# Patient Record
Sex: FEMALE | Race: WHITE | NOT HISPANIC OR LATINO | Employment: FULL TIME | URBAN - METROPOLITAN AREA
[De-identification: names, ages, dates, MRNs, and addresses within clinical notes are randomized per-mention and may not be internally consistent; named-entity substitution may affect disease eponyms.]

---

## 2018-08-13 ENCOUNTER — OFFICE VISIT (OUTPATIENT)
Dept: MEDICAL GROUP | Facility: MEDICAL CENTER | Age: 28
End: 2018-08-13
Payer: COMMERCIAL

## 2018-08-13 VITALS
HEIGHT: 67 IN | RESPIRATION RATE: 20 BRPM | BODY MASS INDEX: 43.32 KG/M2 | OXYGEN SATURATION: 96 % | HEART RATE: 77 BPM | DIASTOLIC BLOOD PRESSURE: 78 MMHG | TEMPERATURE: 98.7 F | SYSTOLIC BLOOD PRESSURE: 118 MMHG | WEIGHT: 276 LBS

## 2018-08-13 DIAGNOSIS — F33.2 SEVERE EPISODE OF RECURRENT MAJOR DEPRESSIVE DISORDER, WITHOUT PSYCHOTIC FEATURES (HCC): ICD-10-CM

## 2018-08-13 DIAGNOSIS — Z30.09 ENCOUNTER FOR COUNSELING REGARDING CONTRACEPTION: ICD-10-CM

## 2018-08-13 DIAGNOSIS — Z00.00 PREVENTATIVE HEALTH CARE: ICD-10-CM

## 2018-08-13 DIAGNOSIS — E66.9 OBESITY (BMI 30-39.9): ICD-10-CM

## 2018-08-13 PROBLEM — F43.23 ADJUSTMENT DISORDER WITH MIXED ANXIETY AND DEPRESSED MOOD: Status: ACTIVE | Noted: 2018-08-13

## 2018-08-13 PROCEDURE — 99402 PREV MED CNSL INDIV APPRX 30: CPT | Performed by: FAMILY MEDICINE

## 2018-08-13 PROCEDURE — 99204 OFFICE O/P NEW MOD 45 MIN: CPT | Mod: 25 | Performed by: FAMILY MEDICINE

## 2018-08-13 RX ORDER — BUPROPION HYDROCHLORIDE 150 MG/1
150 TABLET ORAL EVERY MORNING
Qty: 90 TAB | Refills: 3 | Status: SHIPPED | OUTPATIENT
Start: 2018-08-13

## 2018-08-13 NOTE — PROGRESS NOTES
"This medical record contains text that has been entered with the assistance of computer voice recognition and dictation software.  Therefore, it may contain unintended errors in text, spelling, punctuation, or grammar        Chief Complaint   Patient presents with   • Establish Care   • Depression     Pt. is grieving due to a death in her family 11/2017       Arianna Willams is a 27 y.o. 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     • buPROPion (WELLBUTRIN XL) 150 MG XL tablet Take 1 Tab by mouth every morning. 90 Tab 3     No current facility-administered medications for this visit.      Patient Active Problem List    Diagnosis Date Noted   • Severe episode of recurrent major depressive disorder (HCC) 08/13/2018   • Preventative health care 08/13/2018   • Obesity (BMI 30-39.9) 08/13/2018   • Encounter for counseling regarding contraception 08/13/2018     History reviewed. No pertinent surgical history.   Social History   Substance Use Topics   • Smoking status: Never Smoker   • Smokeless tobacco: Never Used   • Alcohol use Yes      Comment: only socially     History reviewed. No pertinent family history.        ROS    all review of system completed and negative except for those listed above     Objective:     Blood pressure 118/78, pulse 77, temperature 37.1 °C (98.7 °F), resp. rate 20, height 1.689 m (5' 6.5\"), weight (!) 125.2 kg (276 lb), last menstrual period 08/08/2018, SpO2 96 %. Body mass index is 43.88 kg/m².  Physical Exam:        GEN: comfortable, alert and oriented, well nourished, well developed, in no apparent distress   HEENT: NCAT, eyes: pupils equal and reactive, sclera white, EOMIT, good " dentition  HEART: limbs warm and well perfused, regular rate, no JVD, no lower extremity edema  LUNGS: speaking in full sentences, not in apparent respiratory distress, no audible wheezes  MSK: normal tone and bulk, no swelling of the joints, gait steady and normal   She is crying from time to time but she is very pleasant           Assessment and Plan:   The following treatment plan was discussed        Problem List Items Addressed This Visit     Severe episode of recurrent major depressive disorder (HCC)     Start wellbutrin risk benefit side effect discussed  Also recommend counseling and different options discussed [facility] on demand and better health   Follow up 2 mo for depression follow up                Relevant Medications    buPROPion (WELLBUTRIN XL) 150 MG XL tablet    Other Relevant Orders    REFERRAL TO BEHAVIORAL HEALTH    Preventative health care     She is asking for labs  30 min face to face discussing preventative health care specifically wt management and nutrition                   Relevant Orders    TSH WITH REFLEX TO FT4    BASIC METABOLIC PANEL    HEMOGLOBIN A1C    LDL, DIRECT    HDL CHOLESTEROL    CHOLESTEROL    Obesity (BMI 30-39.9)     Counseling provided             Encounter for counseling regarding contraception     Happy with LARC method  All questions answered                           Instructed to follow up if symptoms worsen or fail to improve, ER/UC precautions discussed as well    Meka Brooke MD  [facility], Family Medicine   [address] [place] [address] [address]   [address] [place] [id number]  Phone: [phone]

## 2018-10-15 ENCOUNTER — TELEMEDICINE2 (OUTPATIENT)
Dept: MEDICAL GROUP | Facility: MEDICAL CENTER | Age: 28
End: 2018-10-15
Payer: COMMERCIAL

## 2018-10-15 VITALS — HEIGHT: 67 IN | WEIGHT: 276 LBS | BODY MASS INDEX: 43.32 KG/M2

## 2018-10-15 DIAGNOSIS — J18.9 PNEUMONIA DUE TO INFECTIOUS ORGANISM, UNSPECIFIED LATERALITY, UNSPECIFIED PART OF LUNG: ICD-10-CM

## 2018-10-15 DIAGNOSIS — E66.9 OBESITY (BMI 30-39.9): ICD-10-CM

## 2018-10-15 DIAGNOSIS — F33.2 SEVERE EPISODE OF RECURRENT MAJOR DEPRESSIVE DISORDER, WITHOUT PSYCHOTIC FEATURES (HCC): ICD-10-CM

## 2018-10-15 PROCEDURE — 99401 PREV MED CNSL INDIV APPRX 15: CPT | Performed by: FAMILY MEDICINE

## 2018-10-15 PROCEDURE — 99214 OFFICE O/P EST MOD 30 MIN: CPT | Mod: 25 | Performed by: FAMILY MEDICINE

## 2018-10-15 RX ORDER — ALBUTEROL SULFATE 2.5 MG/3ML
SOLUTION RESPIRATORY (INHALATION)
COMMUNITY
Start: 2018-10-04

## 2018-10-15 RX ORDER — IPRATROPIUM BROMIDE AND ALBUTEROL SULFATE 2.5; .5 MG/3ML; MG/3ML
SOLUTION RESPIRATORY (INHALATION)
COMMUNITY
Start: 2018-10-04

## 2018-10-15 RX ORDER — PREDNISONE 20 MG/1
TABLET ORAL
COMMUNITY
Start: 2018-10-04

## 2018-10-15 RX ORDER — AZITHROMYCIN 250 MG/1
TABLET, FILM COATED ORAL
COMMUNITY
Start: 2018-10-01

## 2018-10-15 ASSESSMENT — PATIENT HEALTH QUESTIONNAIRE - PHQ9
SUM OF ALL RESPONSES TO PHQ9 QUESTIONS 1 AND 2: 2
9. THOUGHTS THAT YOU WOULD BE BETTER OFF DEAD, OR OF HURTING YOURSELF: NOT AT ALL
5. POOR APPETITE OR OVEREATING: NOT AT ALL
2. FEELING DOWN, DEPRESSED, IRRITABLE, OR HOPELESS: SEVERAL DAYS
1. LITTLE INTEREST OR PLEASURE IN DOING THINGS: SEVERAL DAYS
4. FEELING TIRED OR HAVING LITTLE ENERGY: SEVERAL DAYS
7. TROUBLE CONCENTRATING ON THINGS, SUCH AS READING THE NEWSPAPER OR WATCHING TELEVISION: SEVERAL DAYS
8. MOVING OR SPEAKING SO SLOWLY THAT OTHER PEOPLE COULD HAVE NOTICED. OR THE OPPOSITE, BEING SO FIGETY OR RESTLESS THAT YOU HAVE BEEN MOVING AROUND A LOT MORE THAN USUAL: NOT AT ALL
3. TROUBLE FALLING OR STAYING ASLEEP OR SLEEPING TOO MUCH: NEARLY EVERY DAY
6. FEELING BAD ABOUT YOURSELF - OR THAT YOU ARE A FAILURE OR HAVE LET YOURSELF OR YOUR FAMILY DOWN: NOT AL ALL
SUM OF ALL RESPONSES TO PHQ QUESTIONS 1-9: 7

## 2018-10-15 NOTE — ASSESSMENT & PLAN NOTE
Feeling much better  Not radiographic dx so we do not need to do follow up studies at this time      Return precautions rviewed

## 2018-10-15 NOTE — PROGRESS NOTES
"This medical record contains text that has been entered with the assistance of computer voice recognition and dictation software.  Therefore, it may contain unintended errors in text, spelling, punctuation, or grammar    Patient was presented for a telehealth consultation via secure and encrypted videoconferencing technology.       Chief Complaint   Patient presents with   • Follow-Up     depression/ Wellbutrin XL       Arianna Willams is a 27 y.o. female here evaluation and management of: depression and grieving of her grandmothers death in  November, also long term issues obesity and wants to update me recently dx with walking CAP, she apparently had CXR which was normal but the provider who cared for her 2 weeks ago suggested z pack , she is feeling much better! No cough no sob no chest pain    Obesity -   Hasn't weighted herself but clothes fit \"much better!\" and less snacking     Mental health - feels much better! 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     • buPROPion (WELLBUTRIN XL) 150 MG XL tablet Take 1 Tab by mouth every morning. 90 Tab 3   • albuterol (PROVENTIL) 2.5mg/3ml Nebu Soln solution for nebulization      • azithromycin (ZITHROMAX) 250 MG Tab      • ipratropium-albuterol (DUONEB) 0.5-2.5 (3) MG/3ML nebulizer solution      • predniSONE (DELTASONE) 20 MG Tab        No current facility-administered medications for this visit.      Patient Active Problem List    Diagnosis Date Noted   • Pneumonia 10/15/2018   • Severe episode of recurrent major depressive disorder (HCC) 08/13/2018   • Preventative health care 08/13/2018   • Obesity (BMI 30-39.9) 08/13/2018   • Encounter for counseling regarding contraception 08/13/2018     No past surgical history on file.   Social History   Substance Use Topics   • Smoking status: Never " "Smoker   • Smokeless tobacco: Never Used   • Alcohol use Yes      Comment: only socially     No family history on file.        ROS    all review of system completed and negative except for those listed above     Objective:     Height 1.689 m (5' 6.5\"), weight (!) 125.2 kg (276 lb). Body mass index is 43.88 kg/m².  Physical Exam:        GEN: comfortable, alert and oriented, well nourished, well developed, in no apparent distress   HEENT: NCAT, eyes: pupils equal and reactive, sclera white, EOMIT, good dentition  HEART: limbs appear warm and well perfused, regular rate, no JVD, no lower extremity edema  LUNGS: speaking in full sentences, not in apparent respiratory distress, no audible wheezes  MSK: normal tone and bulk, no swelling of the joints, gait steady and normal         Assessment and Plan:   The following treatment plan was discussed        Problem List Items Addressed This Visit     Severe episode of recurrent major depressive disorder (HCC)     Improved on wellbutrin !   No changes today   Follow up prn only              Obesity (BMI 30-39.9)     Feels she is loosing weight   No numbers  15+ min face to face preventative health and obesity counseling diet nutrition strategy             Pneumonia     Feeling much better  Not radiographic dx so we do not need to do follow up studies at this time      Return precautions rviewed             Relevant Medications    albuterol (PROVENTIL) 2.5mg/3ml Nebu Soln solution for nebulization    azithromycin (ZITHROMAX) 250 MG Tab    ipratropium-albuterol (DUONEB) 0.5-2.5 (3) MG/3ML nebulizer solution                Instructed to follow up if symptoms worsen or fail to improve, ER/UC precautions discussed as well    Meka Brooke MD  [facility], Family Medicine   [address] [place] [address] [address]   [address] [place] [id number]  Phone: [phone]             "

## 2018-10-15 NOTE — ASSESSMENT & PLAN NOTE
Feels she is loosing weight   No numbers  15+ min face to face preventative health and obesity counseling diet nutrition strategy